# Patient Record
(demographics unavailable — no encounter records)

---

## 2025-06-04 NOTE — PHYSICAL EXAM
[FreeTextEntry1] : Gait: Presents ambulating independently without signs of antalgia. Good coordination and balance noted. GENERAL: alert, cooperative, in NAD SKIN: The skin is intact, warm, pink and dry over the area examined. EYES: Normal conjunctiva, normal eyelids and pupils were equal and round. ENT: normal ears, normal nose and normal lips. CARDIOVASCULAR: brisk capillary refill, but no peripheral edema. RESPIRATORY: The patient is in no apparent respiratory distress. They're taking full deep breaths without use of accessory muscles or evidence of audible wheezes or stridor without the use of a stethoscope. Normal respiratory effort. ABDOMEN: not examined  Right foot Full flexion and extension of the foot with no discomfort Mild ecchymoses noted over the base of the proximal phalanx of right pinky toe. Muscle strength is 5/5, NV intact. Skin is warm to touch, pulses palpated. Capillary refill +1 in all five digits The joint is stable with stress maneuvers. No discomfort with palpation over the navicular bone, sinus tarsi or any of the metatarsal rays. Good flexibility in the midfoot No pain with palpation over the calcaneus Neurovascularly intact Brisk capillary refill

## 2025-06-04 NOTE — REVIEW OF SYSTEMS
[Change in Activity] : change in activity [Fever Above 102] : no fever [Rash] : no rash [Itching] : no itching [Nasal Stuffiness] : no nasal congestion [Sore Throat] : no sore throat

## 2025-06-04 NOTE — REASON FOR VISIT
[Post ER] : a post ER visit [Initial Evaluation] : an initial evaluation [Parents] : parents [FreeTextEntry1] : Fractured toe on R foot, 05/22/25

## 2025-06-04 NOTE — HISTORY OF PRESENT ILLNESS
[FreeTextEntry1] : A 4-year-old female who presents today with her mother for initial evaluation of right pinky toe injury sustained on 05/22/2025. Mother states that she jumped off the couch, landed awkwardly and injured her right pinky toe. She was taken to [facility], where X-rays right foot were performed and confirmed a nondisplaced fracture of the base of proximal phalanx and recommended for orthopedic evaluation. She was placed in a hard sole. Today mother reports that she is tolerating the cast well denies any discomfort or pain. Denies any tingling sensation or radiating pain. She is not taking any pain medication now. Here for further orthopedic evaluation.

## 2025-06-04 NOTE — HISTORY OF PRESENT ILLNESS
[FreeTextEntry1] : A 4-year-old female who presents today with her mother for initial evaluation of right pinky toe injury sustained on 05/22/2025. Mother states that she jumped off the couch, landed awkwardly and injured her right pinky toe. She was taken to [facility], where X-rays right foot were performed and confirmed a nondisplaced fracture of the base of proximal phalanx and recommended for orthopedic evaluation. She was placed in a hard sole. Today mother reports that she is tolerating the cast well denies any discomfort or pain. Denies any tingling sensation or radiating pain. She is not taking any pain medication now. Here for further orthopedic evaluation.

## 2025-06-04 NOTE — ASSESSMENT
[FreeTextEntry1] : 4-year-old female with Salter II fracture through the base of the right fifth digit proximal phalanx sustained on 05/22/2025.  Today's visit included obtaining the history from the child and parent, due to the child's age, the child could not be considered a reliable historian, requiring the parent to act as an independent historian. The condition, natural history, and prognosis were explained to the family. The clinical findings and images were reviewed with the family. Right foot radiographs were ordered, obtained, and independently reviewed in clinic on 06/04/2025 depicting, Salter II fracture through the base of the right fifth digit proximal phalanx with acceptable alignment for age. No signs of interval healing. Skeletally immature individual. Clinically she has mild discomfort over the right pinky toe. Recommendation at this time would be to continue using hard sole shoe. Weight bearing as tolerated.   No gym, no sports, rough play until cleared by our clinic. An updated school note was provided.   We will plan to see her back in 2 weeks for repeat X-Rays right foot and reevaluation.   All questions and concerns were addressed. Parents vocalized understanding and agreement to assessment and treatment.  I, Christina Rincon , have acted as a scribe and documented the above information for Dr. Beltran  The above documentation completed by the scribe is an accurate record of both my words and actions.

## 2025-06-18 NOTE — DATA REVIEWED
[de-identified] : My interpretation and review of images taken today, 06/17/2025, in office:  Right foot radiographs, 3views, were ordered, obtained, and independently reviewed in clinic depicting a well healing Salter II fracture through the base of the right fifth digit proximal phalanx with acceptable alignment for age. Interval callus noted.  Skeletally immature individual.

## 2025-06-18 NOTE — ASSESSMENT
[FreeTextEntry1] : 4-year-old female with Salter II fracture through the base of the right fifth digit proximal phalanx sustained on 05/22/2025.  Today's visit included obtaining the history from the child and parent, due to the child's age, the child could not be considered a reliable historian, requiring the parent to act as an independent historian.   The condition, natural history, and prognosis were explained to the family. The clinical findings and images were reviewed with the family.  Right foot radiographs, 3 views, were ordered, obtained, and independently reviewed in clinic today depicting a well healing Salter II fracture through the base of the right fifth digit proximal phalanx with acceptable alignment for age. Interval callus noted.  Skeletally immature individual. Clinically, she has no tenderness. She is walking well in her regular shoe wear. At this time, no further immobilization/hard sole shoes noted. She may return to swim and activities without restriction. Follow up as needed if any concerns or issues. This plan was discussed with family and all questions and concerns were addressed today.  Cassidy MARTINEZ PA-C, have acted as a scribe and documented the above for Dr. Beltran  The above documentation completed by the scribe is an accurate record of both my words and actions.

## 2025-06-18 NOTE — DATA REVIEWED
[de-identified] : My interpretation and review of images taken today, 06/17/2025, in office:  Right foot radiographs, 3views, were ordered, obtained, and independently reviewed in clinic depicting a well healing Salter II fracture through the base of the right fifth digit proximal phalanx with acceptable alignment for age. Interval callus noted.  Skeletally immature individual.

## 2025-06-18 NOTE — PHYSICAL EXAM
[FreeTextEntry1] : Healthy appearing 4 year-old child. Awake, alert, in no acute distress. Pleasant and cooperative.  Eyes are clear with no sclera abnormalities. External ears, nose and mouth are clear.  Good respiratory effort with no audible wheezing without use of a stethoscope. Ambulates independently with no evidence of antalgia in crocs. Good coordination and balance. Able to get on and off exam table without difficulty.   Right foot with focus on 5th digit Skin clean and intact No swelling, rotational or angular deformity No ecchymosis or erythema NTTP proximal phalanx of 5th digit Full flexion and extension of the toes with no discomfort Muscle strength is 5/5, NV intact. Skin is warm to touch, pulses palpated. Capillary refill +1 in all five digits The joint is stable with stress maneuvers. No discomfort with palpation over the navicular bone, sinus tarsi or any of the metatarsal rays. Good flexibility in the midfoot No pain with palpation over the calcaneus Neurovascularly intact Brisk capillary refill.